# Patient Record
Sex: MALE | Race: BLACK OR AFRICAN AMERICAN | NOT HISPANIC OR LATINO | Employment: FULL TIME | URBAN - NONMETROPOLITAN AREA
[De-identification: names, ages, dates, MRNs, and addresses within clinical notes are randomized per-mention and may not be internally consistent; named-entity substitution may affect disease eponyms.]

---

## 2018-09-06 ENCOUNTER — OFFICE VISIT CONVERTED (OUTPATIENT)
Dept: FAMILY MEDICINE CLINIC | Age: 48
End: 2018-09-06
Attending: NURSE PRACTITIONER

## 2020-01-14 ENCOUNTER — HOSPITAL ENCOUNTER (OUTPATIENT)
Dept: OTHER | Facility: HOSPITAL | Age: 50
Discharge: HOME | End: 2020-01-14
Attending: NURSE PRACTITIONER

## 2020-01-14 ENCOUNTER — OFFICE VISIT CONVERTED (OUTPATIENT)
Dept: FAMILY MEDICINE CLINIC | Age: 50
End: 2020-01-14
Attending: NURSE PRACTITIONER

## 2020-01-14 LAB
ALBUMIN SERPL-MCNC: 4.1 G/DL (ref 3.5–5)
ALBUMIN/GLOB SERPL: 1.6 {RATIO} (ref 1.4–2.6)
ALP SERPL-CCNC: 81 U/L (ref 53–128)
ALT SERPL-CCNC: 20 U/L (ref 10–40)
AMYLASE SERPL-CCNC: 87 U/L (ref 30–110)
ANION GAP SERPL CALC-SCNC: 15 MMOL/L (ref 8–19)
AST SERPL-CCNC: 20 U/L (ref 15–50)
BILIRUB SERPL-MCNC: 0.2 MG/DL (ref 0.2–1.3)
BUN SERPL-MCNC: 13 MG/DL (ref 5–25)
BUN/CREAT SERPL: 10 {RATIO} (ref 6–20)
CALCIUM SERPL-MCNC: 9.1 MG/DL (ref 8.7–10.4)
CHLORIDE SERPL-SCNC: 106 MMOL/L (ref 99–111)
CONV CO2: 23 MMOL/L (ref 22–32)
CONV TOTAL PROTEIN: 6.7 G/DL (ref 6.3–8.2)
CREAT UR-MCNC: 1.28 MG/DL (ref 0.7–1.2)
ERYTHROCYTE [DISTWIDTH] IN BLOOD BY AUTOMATED COUNT: 13.9 % (ref 11.6–14.4)
GFR SERPLBLD BASED ON 1.73 SQ M-ARVRAT: >60 ML/MIN/{1.73_M2}
GLOBULIN UR ELPH-MCNC: 2.6 G/DL (ref 2–3.5)
GLUCOSE SERPL-MCNC: 104 MG/DL (ref 70–99)
HCT VFR BLD AUTO: 43 % (ref 42–52)
HGB BLD-MCNC: 13.4 G/DL (ref 14–18)
LIPASE SERPL-CCNC: 113 U/L (ref 5–51)
MCH RBC QN AUTO: 24.6 PG (ref 27–31)
MCHC RBC AUTO-ENTMCNC: 31.2 G/DL (ref 33–37)
MCV RBC AUTO: 79 FL (ref 80–96)
OSMOLALITY SERPL CALC.SUM OF ELEC: 290 MOSM/KG (ref 273–304)
PLATELET # BLD AUTO: 162 10*3/UL (ref 130–400)
PMV BLD AUTO: 11.1 FL (ref 9.4–12.4)
POTASSIUM SERPL-SCNC: 4.2 MMOL/L (ref 3.5–5.3)
RBC # BLD AUTO: 5.44 10*6/UL (ref 4.7–6.1)
SODIUM SERPL-SCNC: 140 MMOL/L (ref 135–147)
WBC # BLD AUTO: 5.76 10*3/UL (ref 4.8–10.8)

## 2020-01-20 ENCOUNTER — HOSPITAL ENCOUNTER (OUTPATIENT)
Dept: OTHER | Facility: HOSPITAL | Age: 50
Discharge: HOME | End: 2020-01-20
Attending: NURSE PRACTITIONER

## 2020-01-20 LAB
AMYLASE SERPL-CCNC: 42 U/L (ref 30–110)
CHOLEST SERPL-MCNC: 148 MG/DL (ref 107–200)
CHOLEST/HDLC SERPL: 2.9 {RATIO} (ref 3–6)
HDLC SERPL-MCNC: 51 MG/DL (ref 40–60)
LDLC SERPL CALC-MCNC: 87 MG/DL (ref 70–100)
LIPASE SERPL-CCNC: 18 U/L (ref 5–51)
TRIGL SERPL-MCNC: 48 MG/DL (ref 40–150)
VLDLC SERPL-MCNC: 10 MG/DL (ref 5–37)

## 2021-03-16 ENCOUNTER — OFFICE VISIT CONVERTED (OUTPATIENT)
Dept: FAMILY MEDICINE CLINIC | Age: 51
End: 2021-03-16
Attending: NURSE PRACTITIONER

## 2021-03-16 ENCOUNTER — HOSPITAL ENCOUNTER (OUTPATIENT)
Dept: OTHER | Facility: HOSPITAL | Age: 51
Discharge: HOME | End: 2021-03-16
Attending: NURSE PRACTITIONER

## 2021-04-14 ENCOUNTER — HOSPITAL ENCOUNTER (OUTPATIENT)
Dept: OTHER | Facility: HOSPITAL | Age: 51
Discharge: HOME | End: 2021-04-14
Attending: NURSE PRACTITIONER

## 2021-04-14 ENCOUNTER — OFFICE VISIT CONVERTED (OUTPATIENT)
Dept: FAMILY MEDICINE CLINIC | Age: 51
End: 2021-04-14
Attending: NURSE PRACTITIONER

## 2021-04-15 LAB — SARS-COV-2 RNA SPEC QL NAA+PROBE: NOT DETECTED

## 2021-04-16 LAB — BACTERIA SPEC AEROBE CULT: NORMAL

## 2021-05-18 NOTE — PROGRESS NOTES
Noel Win  1970     Office/Outpatient Visit    Visit Date: Tue, Jan 14, 2020 04:30 pm    Provider: Erum Simpson N.P. (Assistant: Mercy Snow MA)    Location: [facility]        Electronically signed by Erum Simpson N.P. on  01/15/2020 07:09:57 PM                             Subjective:        CC: Mr. Win is a 49 year old Black or  male.  He presents with epigastric pain & rumbling for the past month.          HPI:           PHQ-9 Depression Screening: Completed form scanned and in chart; Total Score 2           With regard to the epigastric pain, this is located primarily in the epigastric region.  It does not radiate.  It began 4 weeks ago.  The onset of pain occurred with no apparent trigger.  He characterizes it as aching and cramping.  He denies change in bowel habits, constipation, diarrhea, fever, nausea and vomiting.  No family members or other contacts are similarly ill.  Mr. Win denies recent travel.  Pertinent medical history includes previous h pylori.            In regard to the anxiety disorder, unspecified, stopped paxil tx due to no insurance.  now has insurance again.  wants to restart paxil.  has been taking his girlfreinds paxil recently     ROS:     CONSTITUTIONAL:  Negative for chills, fatigue, fever, and weight change.      E/N/T:  Negative for hearing problems, E/N/T pain, congestion, rhinorrhea, epistaxis, hoarseness, and dental problems.      CARDIOVASCULAR:  Negative for chest pain, palpitations, tachycardia, orthopnea, and edema.      RESPIRATORY:  Negative for cough, dyspnea, and hemoptysis.      GASTROINTESTINAL:  Positive for abdominal pain ( epigastric ).   Negative for abdominal bloating, dysphagia, constipation, diarrhea, nausea or vomiting.      MUSCULOSKELETAL:  Negative for arthralgias, back pain, and myalgias.      NEUROLOGICAL:  Negative for dizziness, headaches, paresthesias, and weakness.      PSYCHIATRIC:  Positive  for anxiety and feelings of stress.   Negative for depression, sleep disturbance or suicidal thoughts.          Past Medical History / Family History / Social History:         Last Reviewed on 1/15/2020 07:07 PM by Erum Simpson    Past Medical History:             PAST MEDICAL HISTORY         Sleep Apnea: dx'd in feb 2017; (+) sleep study; not using cpap         Surgical History:         Cholecystectomy: 2013;     left knee surgery age 18;    ORIF right 2013;         Family History:     Father: Congestive Heart Failure;  Type 2 Diabetes     paternal uncle MI age 58         Social History:         Marital Status: Single     Children: 3 children         Tobacco/Alcohol/Supplements:     Last Reviewed on 1/14/2020 04:31 PM by Mercy Snow    Tobacco: He has a past history of cigarette smoking; quit date:  jan 2016.   Current Smoker: He currently smokes some days, 1-5 cigarettes/month.          Current Problems:     Last Reviewed on 1/14/2020 04:50 PM by Erum Simpson    Anxiety disorder, unspecified    Testicular hyperfunction    Pure hyperglyceridemia    Heartburn    Encounter for screening for depression    Encounter for immunization    Vitamin D deficiency, unspecified        Immunizations:     None        Allergies:     Last Reviewed on 9/06/2018 03:46 PM by Terri Vences    No Known Allergies.        Current Medications:     Last Reviewed on 1/14/2020 04:32 PM by Mercy Snow    No Known Medications.    Paxil 40mg Tablet [Take 1 tablet(s) by mouth daily]        Objective:        Vitals:         Historical:     9/6/2018  BP:   128/68 mm Hg ( (left arm, , sitting, );) 9/6/2018  Wt:   239lbs    Current: 1/14/2020 4:34:22 PM    Ht:  6 ft, 2.75 in;  Wt: 226.2 lbs;  BMI: 28.5T: 98.2 F (oral);  BP: 133/65 mm Hg (left arm, sitting);  P: 65 bpm (left arm (BP Cuff), sitting);  sCr: 1.3 mg/dL;  GFR: 76.72        Exams:     PHYSICAL EXAM:     GENERAL:  well developed and nourished; appropriately groomed; in  no apparent distress;     RESPIRATORY: normal respiratory rate and pattern with no distress; normal breath sounds with no rales, rhonchi, wheezes or rubs;     CARDIOVASCULAR: normal rate; rhythm is regular;     GASTROINTESTINAL: nontender; normal bowel sounds; no organomegaly;     MUSCULOSKELETAL:  Normal range of motion, strength and tone;     NEUROLOGIC: mental status: alert and oriented x 3; GROSSLY INTACT     PSYCHIATRIC:  appropriate affect and demeanor; normal speech pattern; grossly normal memory;         Assessment:         Z23   Encounter for immunization       Z13.31   Encounter for screening for depression       R10.13   Epigastric pain       F41.9   Anxiety disorder, unspecified           ORDERS:         Meds Prescribed:       [New Rx] omeprazole 40 mg oral capsule,delayed release (enteric coated) [take 1 capsule (40 mg) by oral route once daily], #30 (thirty) capsules, Refills: 0 (zero)       [Refilled] Paxil 40 mg oral tablet [Take 1 tablet(s) by mouth daily], #90 (ninety) tablets, Refills: 1 (one)         Lab Orders:       [id number]  AMYS - [facility] Amylase, Serum  (Send-Out)            20667  BDCB2 - [facility] CBC w/o diff  (Send-Out)            65751  COMP - [facility] Comp. Metabolic Panel  (Send-Out)            66472  LIP - [facility] Lipase, Serum  (Send-Out)              Procedures Ordered:       39625  Immunization administration; one vaccine  (In-House)              Other Orders:       87426  Influenza virus vaccine, quadrivalent, split virus, preservative free 3 years of age & older  (In-House)              Depression screen negative  (In-House)                      Plan:         Encounter for immunization          Immunizations:       68914  Immunization administration; one vaccine  (In-House)            05981  Influenza virus vaccine, quadrivalent, split virus, preservative free 3 years of age & older  (In-House)                Dose (ml): 0.5  Site: right arm  Route: intramuscular  Administered by: Spurling, Sarah  C          : [employer]  Lot #: [id number]  Exp: 06/30/2020          NDC: [address]-50        Encounter for screening for depression    MIPS PHQ-9 Depression Screening: Completed form scanned and in chart; Total Score 2; Negative Depression Screen           Orders:         Depression screen negative  (In-House)              Epigastric pain    LABORATORY:  Labs ordered to be performed today include amylase, CBC W/O DIFF, Comprehensive metabolic panel, and Lipase.      RECOMMENDATIONS given include: discontinue use of NSAIDs and aspirin, avoid spicy foods, and to ER if worsens..            Prescriptions:       [New Rx] omeprazole 40 mg oral capsule,delayed release (enteric coated) [take 1 capsule (40 mg) by oral route once daily], #30 (thirty) capsules, Refills: 0 (zero)           Orders:       19837  AMYS - [facility] Amylase, Serum  (Send-Out)            62122  BDCB2 - [facility] CBC w/o diff  (Send-Out)            10811  COMP - [facility] Comp. Metabolic Panel  (Send-Out)            04028  LIP - [facility] Lipase, Serum  (Send-Out)              Anxiety disorder, unspecified          Prescriptions:       [Refilled] Paxil 40 mg oral tablet [Take 1 tablet(s) by mouth daily], #90 (ninety) tablets, Refills: 1 (one)             Patient Recommendations:        For  Epigastric pain:        Stop taking aspirin and anti-inflammatory medications such as ibuprofen and naproxen.     Avoid spicy foods in your diet.              Charge Capture:         Primary Diagnosis:     Z23  Encounter for immunization           Orders:      19223  Office/outpatient visit; established patient, level 4  (In-House)            03780  Immunization administration; one vaccine  (In-House)            24344  Influenza virus vaccine, quadrivalent, split virus, preservative free 3 years of age & older  (In-House)              Z13.31  Encounter for screening for depression           Orders:        Depression screen negative  (In-House)              R10.13   Epigastric pain     F41.9  Anxiety disorder, unspecified

## 2021-05-18 NOTE — PROGRESS NOTES
Noel Win  1970     Office/Outpatient Visit    Visit Date: Tue, Mar 16, 2021 01:48 pm    Provider: Erum Simpson N.P. (Assistant: Mariella Belcher MA)    Location: [facility]        Electronically signed by Erum Simpson N.P. on  03/16/2021 08:45:51 PM                             Subjective:        CC: Mr. Win is a 50 year old Black or  male.  right knee pain, went to ER last monday         HPI:           hx right meniscus repoar per dr arellano 3 yrs ago.  pain never completely resolved and has increased the last couple of weeks.  no known injury.  knee is less stiff in am.  sometimes feels as if knee might give out under his weight and knee pops.  went to [facility] ER last week.  xray done .  no abnormality reported to him.  rec'd steroid shot in buttocks and pain shot in arm.  not very helpful.            Additionally, he presents with history of anxiety disorder, unspecified.  does better with use of paxil and he has run out or followed up.  denies side effects.  requests refills.            tx one year ago.  never had follow up test.  still with some GERD symptoms.      ROS:     CONSTITUTIONAL:  Negative for chills, fatigue, fever, and weight change.      CARDIOVASCULAR:  Negative for chest pain, palpitations, tachycardia, orthopnea, and edema.      RESPIRATORY:  Negative for cough, dyspnea, and hemoptysis.      GASTROINTESTINAL:  Positive for acid reflux symptoms and heartburn.   Negative for constipation, diarrhea, nausea or vomiting.      MUSCULOSKELETAL:  Positive for arthralgias (right knee).      PSYCHIATRIC:  Positive for anxiety.   Negative for sleep disturbance.          Past Medical History / Family History / Social History:         Last Reviewed on 3/16/2021 02:03 PM by Erum Simpson    Past Medical History:             PAST MEDICAL HISTORY         Sleep Apnea: dx'd in feb 2017; (+) sleep study; not using cpap         Surgical History:          Cholecystectomy: 2013;     right menisucus repair 2018;     left knee surgery age 18;    ORIF right 2013;         Family History:     Father: Congestive Heart Failure;  Type 2 Diabetes     paternal uncle MI age 58         Social History:         Marital Status: Single     Children: 3 children         Tobacco/Alcohol/Supplements:     Last Reviewed on 3/16/2021 01:52 PM by Mariella Belcher    Tobacco: Current Smoker: He currently smokes every day, 1/2 pack per day.          Current Problems:     Last Reviewed on 3/16/2021 08:42 PM by Erum Simpson    Anxiety disorder, unspecified    Pure hyperglyceridemia    Vitamin D deficiency, unspecified    Epigastric pain    Helicobacter pylori [H. pylori] as the cause of diseases classified elsewhere    Pain in right knee        Immunizations:     influenza, injectable, quadrivalent, preservative free (FLUZONE QUAD 5660-9586) 1/14/2020        Allergies:     Last Reviewed on 3/16/2021 01:52 PM by Mariella Belcher    No Known Allergies.        Current Medications:     Last Reviewed on 3/16/2021 01:52 PM by Mariella Belcher    No Known Medications.    Paxil 40 mg oral tablet [Take 1 tablet(s) by mouth daily]    omeprazole 40 mg oral capsule,delayed release (enteric coated) [take 1 capsule (40 mg) by oral route once daily]        Objective:        Vitals:         Historical:     1/14/2020  BP:   133/65 mm Hg ( (left arm, , sitting, );) 1/14/2020  Wt:   226.2lbs    Current: 3/16/2021 1:53:41 PM    Ht:  6 ft, 2.75 in;  Wt: 240.4 lbs;  BMI: 30.2T: 96.3 F (temporal);  BP: 139/76 mm Hg (left arm, sitting);  P: 79 bpm (left arm (BP Cuff), sitting);  sCr: 1.28 mg/dL;  GFR: 79.10        Exams:     PHYSICAL EXAM:     GENERAL: no apparent distress;     RESPIRATORY: normal respiratory rate and pattern with no distress; normal breath sounds with no rales, rhonchi, wheezes or rubs;     CARDIOVASCULAR: normal rate; rhythm is regular;  no edema;     MUSCULOSKELETAL: gait: affected by a right leg limp;   normal range of motion of all major muscle groups; pain with range of motion in: right knee flexion and extension;     NEUROLOGIC: mental status: alert and oriented x 3; GROSSLY INTACT     PSYCHIATRIC:  appropriate affect and demeanor; normal speech pattern; grossly normal memory;         Assessment:         M25.561   Pain in right knee       F41.9   Anxiety disorder, unspecified       B96.81   Helicobacter pylori [H. pylori] as the cause of diseases classified elsewhere           ORDERS:         Meds Prescribed:       [New Rx] meloxicam 15 mg oral tablet [take 1 tablet (15 mg) by oral route once daily do not take with ibuprofen or aleve-  take with food], #30 (thirty) tablets, Refills: 0 (zero)       [Refilled] Paxil 40 mg oral tablet [Take 1 tablet(s) by mouth daily], #90 (ninety) tablets, Refills: 1 (one)         Lab Orders:       39298  HPUBT - [facility] H.pylori Breath test  (Send-Out)                      Plan:         Pain in right kneework note march 18 and march 19        RECOMMENDATIONS given include: rest, ice , compression, elevation.  obtain [facility] ER records fro review. and consider MRI right knee or referral to ortho.  MIPS Vaccines Flu and Pneumonia updated in Shot record           Prescriptions:       [New Rx] meloxicam 15 mg oral tablet [take 1 tablet (15 mg) by oral route once daily do not take with ibuprofen or aleve-  take with food], #30 (thirty) tablets, Refills: 0 (zero)         Anxiety disorder, unspecified          Prescriptions:       [Refilled] Paxil 40 mg oral tablet [Take 1 tablet(s) by mouth daily], #90 (ninety) tablets, Refills: 1 (one)         Helicobacter pylori [H. pylori] as the cause of diseases classified elsewhere    LABORATORY:  Labs ordered to be performed today include H.pylori urea breath test ([facility]).            Orders:       00392  HPUBT - [facility] H.pylori Breath test  (Send-Out)                  Charge Capture:         Primary Diagnosis:     M25.561  Pain in right knee            Orders:      17478  Office/outpatient visit; established patient, level 3  (In-House)              F41.9  Anxiety disorder, unspecified     B96.81  Helicobacter pylori [H. pylori] as the cause of diseases classified elsewhere

## 2021-05-18 NOTE — PROGRESS NOTES
Noel Win 1970     Office/Outpatient Visit    Visit Date: Wed, Apr 14, 2021 4:04 pm    Provider: Brandy Goncalves N.P. (Assistant: Mariella Belcher MA )    Location: [facility]        Electronically signed by Brandy Goncalves N.P. on  04/16/2021 10:56:35 PM                             Subjective:        CC: Mr. Win is a 51 year old Black or  male.  cough, chills/sweats, congestion, weak, sore throat         HPI:           Patient to be evaluated for cough.  It has been present for the past 2 weeks.  Respiratory symptoms include cough and sinus pressure.  Other symptoms include allergy symptoms, body aches, chest congestion, nasal congestion, sinus pain/pressure, chills, sweats and fatigue.  He denies fever.  He reports recent exposure to illness from family members.  He has already tried to relieve the symptoms with acetaminophen, antihistamines, and cough medication.      ROS:     CONSTITUTIONAL:  Positive for chills and fatigue.   Negative for fever.      E/N/T:  Positive for nasal congestion, sinus pressure and sore throat.      CARDIOVASCULAR:  Negative for chest pain and pedal edema.      RESPIRATORY:  Positive for recent cough and pleuritic chest pain.   Negative for dyspnea.      GASTROINTESTINAL:  Negative for diarrhea, nausea and vomiting.      MUSCULOSKELETAL:  Positive for arthralgias and myalgias.      NEUROLOGICAL:  Positive for weakness.      ENDOCRINE:  Negative for hair loss, polydipsia and polyphagia.      ALLERGIC/IMMUNOLOGIC:  Negative for seasonal allergies.      PSYCHIATRIC:  Negative for anxiety, depression and suicidal thoughts.          Past Medical History / Family History / Social History:         Last Reviewed on 3/16/2021 02:03 PM by Erum Simpson    Past Medical History:             PAST MEDICAL HISTORY         Sleep Apnea: dx'd in feb 2017; (+) sleep study; not using cpap         Surgical History:         Cholecystectomy: 2013;     right  menisucus repair 2018;     left knee surgery age 18;    ORIF right 2013;         Family History:     Father: Congestive Heart Failure;  Type 2 Diabetes     paternal uncle MI age 58         Social History:         Marital Status: Single     Children: 3 children         Tobacco/Alcohol/Supplements:     Last Reviewed on 4/14/2021 04:13 PM by Mariella Belcher    Tobacco: Current Smoker: He currently smokes every day, 1/2 pack per day.          Current Problems:     Last Reviewed on 4/16/2021 10:50 PM by Brandy Goncalves    Anxiety disorder, unspecified    Pure hyperglyceridemia    Vitamin D deficiency, unspecified    Epigastric pain    Helicobacter pylori [H. pylori] as the cause of diseases classified elsewhere    Pain in right knee    Acute bronchitis, unspecified    Cough    Acute pharyngitis, unspecified        Immunizations:     influenza, injectable, quadrivalent, preservative free (FLUZONE QUAD 3091-2018) 1/14/2020        Allergies:     Last Reviewed on 4/14/2021 04:13 PM by Mariella Belcher    No Known Allergies.        Current Medications:     Last Reviewed on 4/14/2021 04:13 PM by Mariella Belcher    No Known Medications.    Paxil 40 mg oral tablet [Take 1 tablet(s) by mouth daily]    omeprazole 40 mg oral capsule,delayed release (enteric coated) [take 1 capsule (40 mg) by oral route once daily]    meloxicam 15 mg oral tablet [take 1 tablet (15 mg) by oral route once daily do not take with ibuprofen or aleve-  take with food]        Objective:        Vitals:         Current: 4/14/2021 4:15:10 PM    Ht:  6 ft, 2.75 in;  Wt: 242 lbs;  BMI: 30.5T: 98.2 F (temporal);  BP: 127/76 mm Hg (left arm, sitting);  P: 88 bpm (left arm (BP Cuff), sitting);  sCr: 1.28 mg/dL;  GFR: 78.47O2 Sat: 98 % (room air)        Exams:     PHYSICAL EXAM:     GENERAL: Vitals recorded well developed, well nourished;  no apparent distress, appears moderately ill;     E/N/T: EARS: external auditory canal normal bilaterally and erythematous  bilaterally;  bilateral TMs are normal;  OROPHARYNX:  normal mucosa, dentition, gingiva, and posterior pharynx;     RESPIRATORY: normal appearance and symmetric expansion of chest wall; normal respiratory rate and pattern with no distress; normal breath sounds with no rales, rhonchi, wheezes or rubs;     CARDIOVASCULAR: normal rate; rhythm is regular;     LYMPHATIC: no enlargement of cervical or facial nodes;     MUSCULOSKELETAL: normal gait; normal range of motion of all major muscle groups; no limb or joint pain with range of motion;     NEUROLOGIC: mental status: alert and oriented x 3;     PSYCHIATRIC: appropriate affect and demeanor; normal speech pattern; normal thought and perception;         Lab/Test Results:         Rapid Strep Screen: Negative (04/14/2021),     Performed by:: tls (04/14/2021),     Influenza A and B: Negative (04/14/2021),     Rapid SARS: Negative (04/14/2021),     Performed by: tls (04/14/2021),             Assessment:         J20.9   Acute bronchitis, unspecified       R05   Cough       J02.9   Acute pharyngitis, unspecified           ORDERS:         Meds Prescribed:       [New Rx] azithromycin 250 mg oral tablet [take 2 tablets today and 1 tablet (250 mg) by oral route once daily x the next 4 days], #6 (six) tablets, Refills: 0 (zero)       [New Rx] Bromfed DM 2-30-10 mg/5 mL oral Syrup [take 10 milliliters by oral route every 4 hours PRN], #240 (two hundred and forty) milliliters, Refills: 1 (one)         Lab Orders:       [id number]  Coronavirus antigen detection by immunoassay technique, COVID-19  (In-House)            72403-07  Infectious agent antigen detection by immunoassay; Influenza  (In-House)            07201  Group A Streptococcus detection by immunoassay with direct optical observation  (In-House)            56949  Infectious agent antigen detection by immunoassay; Influenza  (In-House)            62822  [facility] Throat culture, strep  (Send-Out)            07438  COVID 19  Testing  (Send-Out)                      Plan:         Acute bronchitis, unspecifiedIf no improvement or worsening, ER precautions given or RTO for further assessment.  Will get CXR if no improvement     LABORATORY:  Labs ordered to be performed today include COVID 19 Testing.            Prescriptions:       [New Rx] azithromycin 250 mg oral tablet [take 2 tablets today and 1 tablet (250 mg) by oral route once daily x the next 4 days], #6 (six) tablets, Refills: 0 (zero)           Orders:       86574  Coronavirus antigen detection by immunoassay technique, COVID-19  (In-House)            92988-45  Infectious agent antigen detection by immunoassay; Influenza  (In-House)            03062  Group A Streptococcus detection by immunoassay with direct optical observation  (In-House)            52517  Infectious agent antigen detection by immunoassay; Influenza  (In-House)            73050  [facility] Throat culture, strep  (Send-Out)            56666  COVID 19 Testing  (Send-Out)              Cough          Prescriptions:       [New Rx] Bromfed DM 2-30-10 mg/5 mL oral Syrup [take 10 milliliters by oral route every 4 hours PRN], #240 (two hundred and forty) milliliters, Refills: 1 (one)         Acute pharyngitis, unspecifiedwarm salt gargles,  Take OTC antihistamines daily, fluticasone OTC and RTO if no improvement            Charge Capture:         Primary Diagnosis:     J20.9  Acute bronchitis, unspecified           Orders:      54531  Office/outpatient visit; established patient, level 3  (In-House)            28012  Coronavirus antigen detection by immunoassay technique, COVID-19  (In-House)            69727-98  Infectious agent antigen detection by immunoassay; Influenza  (In-House)            63681  Group A Streptococcus detection by immunoassay with direct optical observation  (In-House)            51069  Infectious agent antigen detection by immunoassay; Influenza  (In-House)              R05  Cough     J02.9   Acute pharyngitis, unspecified

## 2021-05-18 NOTE — PROGRESS NOTES
Noel Win 1970     Office/Outpatient Visit    Visit Date: Thu, Sep 6, 2018 03:44 pm    Provider: Erum Simpson N.P. (Assistant: Terri Vences MA)    Location: [facility]        Electronically signed by Erum Simpson N.P. on  09/06/2018 08:53:09 PM                             SUBJECTIVE:        CC:     Mr. Win is a 48 year old Black or  male.  patient presents today with complaints of worsening anxiety and some panic attacks.          HPI:         PHQ-9 Depression Screening: Completed form scanned and in chart; Total Score 9 Alcohol Consumption Screening: Completed form scanned and in chart; Total Score 6         In regard to the anxiety, generalized, his anxiety disorder was originally diagnosed more than 5 years ago.  His symptom complex includes apprehension, feeling of impending doom, and palpitations.  True panic attacks apparently do not occur.  The frequency symptoms is several times per week.  Apparent triggers include occupational stressors and will be laid off from work.  He is not currently being treated for anxiety.  Previous attempts at treatment have included Paxil.  He denies pertinent past medical history.          noted per oct 2017 labs.          not currently on supplement.          In regard to the gERD, the location of the discomfort is primarily epigastric.  He describes the pain as burning.  It is mild in intensity.  this has been a chronic, recurrent problem for the past 2 months.  Past medical history is pertinent for documented H. pylori.      ROS:     CONSTITUTIONAL:  Negative for chills, fatigue, fever, and weight change.      CARDIOVASCULAR:  Negative for chest pain, palpitations, tachycardia, orthopnea, and edema.      RESPIRATORY:  Negative for cough, dyspnea, and hemoptysis.      GASTROINTESTINAL:  Positive for abdominal pain ( epigastric ) and acid reflux symptoms.   Negative for constipation, diarrhea, heartburn, nausea or vomiting.       MUSCULOSKELETAL:  Negative for arthralgias, back pain, and myalgias.      NEUROLOGICAL:  Negative for dizziness, headaches, paresthesias, and weakness.      PSYCHIATRIC:  Positive for anxiety, feelings of stress and insomnia.   Negative for depression, difficulty concentrating, recreational drug use or suicidal thoughts.          PMH/FMH/SH:     Last Reviewed on 9/06/2018 04:04 PM by Erum Simpson    Past Medical History:             PAST MEDICAL HISTORY         Sleep Apnea: dx'd in feb 2017; (+) sleep study; not using cpap         Surgical History:         Cholecystectomy: 2013;      left knee surgery age 18;    ORIF right 2013;         Family History:     Father: Congestive Heart Failure;  Type 2 Diabetes     paternal uncle MI age 58         Social History:         Marital Status: Single     Children: 3 children         Tobacco/Alcohol/Supplements:     Last Reviewed on 9/06/2018 03:46 PM by Terri Vences    Tobacco: He has a past history of cigarette smoking; quit date:  jan 2016.   Current Smoker: He currently smokes some days, 1-5 cigarettes/month.              Current Problems:     Last Reviewed on 1/19/2017 11:33 AM by Demarcus Layton    Elevated cholesterol     Testosterone deficiency     Anxiety, generalized         Immunizations:     None        Allergies:     Last Reviewed on 9/26/2017 02:49 PM by Salome Jacobsen      No Known Drug Allergies.         Current Medications:     Last Reviewed on 9/06/2018 03:46 PM by Terri Vences      No Known Medications.         OBJECTIVE:        Vitals:         Historical:     09/26/2017  BP:   127/82 mm Hg ( (left arm, , sitting, );)     09/26/2017  Wt:   252lbs        Current: 9/6/2018 3:49:35 PM    Ht:  6 ft, 2.75 in;  Wt: 239 lbs;  BMI: 30.1    T: 98.7 F (oral);  BP: 128/68 mm Hg (left arm, sitting);  P: 73 bpm (left arm (BP Cuff), sitting);  sCr: 1.3 mg/dL;  GFR: 79.37        Exams:     PHYSICAL EXAM:     GENERAL:  well developed and nourished;  appropriately groomed; in no apparent distress;     NECK: thyroid is non-palpable;     RESPIRATORY: normal respiratory rate and pattern with no distress; normal breath sounds with no rales, rhonchi, wheezes or rubs;     CARDIOVASCULAR: normal rate; rhythm is regular;     MUSCULOSKELETAL:  Normal range of motion, strength and tone;     NEUROLOGIC: mental status: alert and oriented x 3; GROSSLY INTACT     PSYCHIATRIC:  appropriate affect and demeanor; normal speech pattern; grossly normal memory;         ASSESSMENT           V79.0   Z13.89  Screening for depression              DDx:     300.02   F41.9  Anxiety, generalized              DDx:     790.6   R73.01  Hyperglycemia              DDx:     268.9   E55.9  Vitamin D deficiency, unspecified              DDx:     530.81   R12  GERD              DDx:     272.4   E78.2  Hyperlipidemia              DDx:         ORDERS:         Meds Prescribed:       Paxil (Paroxetine HCl) 40mg Tablet Take 1 tablet(s) by mouth daily  #90 (Ninety) tablet(s) Refills: 1       Omeprazole 40mg Capsules, Extended Release 1 capsule daily  #30 (Thirty) capsule(s) Refills: 0         Lab Orders:       61754  COMP - [facility] Comp. Metabolic Panel  (Send-Out)         43855  A1CEG - H Hemoglobin A1C  (Send-Out)         68546  VITD - [facility] Vitamin D, 25 Hydroxy  (Send-Out)         26410  HPUBT - [facility] H.pylori Breath test  (Send-Out)         97241  LPDP - [facility] Lipid Panel  (Send-Out)           Other Orders:         Depression screen positive and follow up plan documented  (In-House)                   PLAN:          Screening for depression     MIPS PHQ-9 Depression Screening: Completed form scanned and in chart; Total Score 9           Orders:         Depression screen positive and follow up plan documented  (In-House)            Anxiety, generalized         RECOMMENDATIONS given include: avoidance of caffeine, stress reduction, and paxil has always worked well prreviously.  restart paxil and take  consistently..            Prescriptions:       Paxil (Paroxetine HCl) 40mg Tablet Take 1 tablet(s) by mouth daily  #90 (Ninety) tablet(s) Refills: 1          Hyperglycemia     LABORATORY:  Labs ordered to be performed today include Comprehensive metabolic panel and HgbA1C.      RECOMMENDATIONS given include: decrease intake of sugar especially sugar sweetened drinks..            Orders:       27844  COMP - [facility] Comp. Metabolic Panel  (Send-Out)         18313  A1CEG - H Hemoglobin A1C  (Send-Out)            Vitamin D deficiency, unspecified     LABORATORY:  Labs ordered to be performed today include Vitamin D.            Orders:       62453  VITD - [facility] Vitamin D, 25 Hydroxy  (Send-Out)            GERD     LABORATORY:  Labs ordered to be performed today include H.pylori urea breath test ([facility]).            Prescriptions:       Omeprazole 40mg Capsules, Extended Release 1 capsule daily  #30 (Thirty) capsule(s) Refills: 0           Orders:       07520  HPUBT - [facility] H.pylori Breath test  (Send-Out)            Hyperlipidemia     LABORATORY:  Labs ordered to be performed today include lipid panel.            Orders:       90007  LPDP - [facility] Lipid Panel  (Send-Out)               Patient Recommendations:        For  Anxiety, generalized:     Try to avoid or reduce the amount of caffeine intake. Try stress reduction methods to reduce the frequency or lessen the severity of anxiety episodes.              CHARGE CAPTURE           **Please note: ICD descriptions below are intended for billing purposes only and may not represent clinical diagnoses**        Primary Diagnosis:         V79.0 Screening for depression            Z13.89    Encounter for screening for other disorder              Orders:          55293   Office/outpatient visit; established patient, level 4  (In-House)                Depression screen positive and follow up plan documented  (In-House)           300.02 Anxiety, generalized            F41.9    Anxiety  disorder, unspecified    790.6 Hyperglycemia            R73.01    Impaired fasting glucose    268.9 Vitamin D deficiency, unspecified            E55.9    Vitamin D deficiency, unspecified    530.81 GERD            R12    Heartburn    272.4 Hyperlipidemia            E78.2    Mixed hyperlipidemia

## 2021-07-01 VITALS
BODY MASS INDEX: 29.72 KG/M2 | WEIGHT: 239 LBS | SYSTOLIC BLOOD PRESSURE: 128 MMHG | HEIGHT: 75 IN | DIASTOLIC BLOOD PRESSURE: 68 MMHG | HEART RATE: 73 BPM | TEMPERATURE: 98.7 F

## 2021-07-02 VITALS
SYSTOLIC BLOOD PRESSURE: 139 MMHG | DIASTOLIC BLOOD PRESSURE: 76 MMHG | BODY MASS INDEX: 29.89 KG/M2 | WEIGHT: 240.4 LBS | HEIGHT: 75 IN | TEMPERATURE: 96.3 F | HEART RATE: 79 BPM

## 2021-07-02 VITALS
TEMPERATURE: 98.2 F | HEART RATE: 65 BPM | DIASTOLIC BLOOD PRESSURE: 65 MMHG | BODY MASS INDEX: 28.12 KG/M2 | WEIGHT: 226.2 LBS | SYSTOLIC BLOOD PRESSURE: 133 MMHG | HEIGHT: 75 IN

## 2021-07-02 VITALS
BODY MASS INDEX: 30.09 KG/M2 | HEIGHT: 75 IN | WEIGHT: 242 LBS | OXYGEN SATURATION: 98 % | HEART RATE: 88 BPM | SYSTOLIC BLOOD PRESSURE: 127 MMHG | TEMPERATURE: 98.2 F | DIASTOLIC BLOOD PRESSURE: 76 MMHG

## 2021-08-24 ENCOUNTER — TRANSCRIBE ORDERS (OUTPATIENT)
Dept: FAMILY MEDICINE CLINIC | Age: 51
End: 2021-08-24

## 2021-08-24 ENCOUNTER — CLINICAL SUPPORT (OUTPATIENT)
Dept: FAMILY MEDICINE CLINIC | Age: 51
End: 2021-08-24

## 2021-08-24 DIAGNOSIS — R19.7 DIARRHEA, UNSPECIFIED TYPE: Primary | ICD-10-CM

## 2021-08-24 PROCEDURE — U0003 INFECTIOUS AGENT DETECTION BY NUCLEIC ACID (DNA OR RNA); SEVERE ACUTE RESPIRATORY SYNDROME CORONAVIRUS 2 (SARS-COV-2) (CORONAVIRUS DISEASE [COVID-19]), AMPLIFIED PROBE TECHNIQUE, MAKING USE OF HIGH THROUGHPUT TECHNOLOGIES AS DESCRIBED BY CMS-2020-01-R: HCPCS | Performed by: FAMILY MEDICINE

## 2021-08-26 ENCOUNTER — TELEPHONE (OUTPATIENT)
Dept: FAMILY MEDICINE CLINIC | Age: 51
End: 2021-08-26

## 2021-08-26 LAB — SARS-COV-2 RNA RESP QL NAA+PROBE: NOT DETECTED

## 2021-08-26 NOTE — TELEPHONE ENCOUNTER
Caller: Noel Win Jr.    Relationship: Self    Best call back number: [phone]    Caller requesting test results: YES    What test was performed: COVID    When was the test performed: TUESDAY    Where was the test performed: OFFICE     Additional notes:

## 2021-08-27 ENCOUNTER — TELEPHONE (OUTPATIENT)
Dept: FAMILY MEDICINE CLINIC | Age: 51
End: 2021-08-27

## 2021-11-23 ENCOUNTER — OFFICE VISIT (OUTPATIENT)
Dept: FAMILY MEDICINE CLINIC | Age: 51
End: 2021-11-23

## 2021-11-23 VITALS
HEIGHT: 75 IN | WEIGHT: 252.4 LBS | HEART RATE: 73 BPM | TEMPERATURE: 98.3 F | DIASTOLIC BLOOD PRESSURE: 67 MMHG | BODY MASS INDEX: 31.38 KG/M2 | OXYGEN SATURATION: 99 % | SYSTOLIC BLOOD PRESSURE: 143 MMHG

## 2021-11-23 DIAGNOSIS — G47.63 BRUXISM, SLEEP-RELATED: ICD-10-CM

## 2021-11-23 DIAGNOSIS — K08.9 POOR DENTITION: Primary | ICD-10-CM

## 2021-11-23 PROCEDURE — 99213 OFFICE O/P EST LOW 20 MIN: CPT | Performed by: FAMILY MEDICINE

## 2021-11-23 RX ORDER — PAROXETINE HYDROCHLORIDE 40 MG/1
40 TABLET, FILM COATED ORAL EVERY MORNING
COMMUNITY

## 2021-11-23 RX ORDER — AMOXICILLIN AND CLAVULANATE POTASSIUM 875; 125 MG/1; MG/1
1 TABLET, FILM COATED ORAL 2 TIMES DAILY
Qty: 28 TABLET | Refills: 0 | Status: SHIPPED | OUTPATIENT
Start: 2021-11-23 | End: 2021-12-07

## 2021-11-23 NOTE — PROGRESS NOTES
"Chief Complaint  Edema (Pt states that about a week ago, he felt some knots in his mouth on both sides, then pain started in his head as well, now pt states that his face is slightly swollen.)    Subjective          Noel Win JrYasemin presents to [facility]  History of Present Illness 51-year-old male, presented as a walk-in today to the office for an acute visit.  He has had some left-sided facial swelling has what he reported to us also feels that he has some \"knots\" in his mouth on both sides.  Then he started having pain he says in his head along with the swelling in his face.    He has some slight chronic sinus congestion.  Mostly his pain is in the front of his mouth and his teeth which worse when he drinks of the cold and has been bothering him ever since.  He admits that he has not seen his dentist in a very long time.  He also believes that he does not fact grind his teeth at night.    Review of Systems   Constitutional: Negative.    HENT: Positive for congestion, dental problem, facial swelling, postnasal drip and sinus pressure. Negative for drooling, ear discharge, ear pain, hearing loss, mouth sores, nosebleeds, rhinorrhea, sinus pain, sneezing, sore throat, tinnitus, trouble swallowing and voice change.         Facial swelling I do not really notice today but the patient points to her around his sinus area both frontal and maxillary.   Eyes: Negative.    Respiratory: Negative.    Cardiovascular: Negative.         No Known Allergies    Current Outpatient Medications:   •  PARoxetine (Paxil) 40 MG tablet, Take 40 mg by mouth Every Morning., Disp: , Rfl:     Objective   Vital Signs:   /67 (BP Location: Right arm, Patient Position: Sitting)   Pulse 73   Temp 98.3 °F (36.8 °C) (Oral)   Ht 189.9 cm (74.75\")   Wt 114 kg (252 lb 6.4 oz)   SpO2 99% Comment: room air  BMI 31.76 kg/m²     Physical Exam  Vitals reviewed.   Constitutional:       General: He is not in " acute distress.     Appearance: Normal appearance. He is normal weight. He is not ill-appearing, toxic-appearing or diaphoretic.   HENT:      Head: Normocephalic and atraumatic.      Right Ear: Tympanic membrane and ear canal normal.      Left Ear: Tympanic membrane and ear canal normal.      Nose:      Comments: Minimal turbinate edema, minimal postnasal drip.  Some tenderness both on frontal and maxillary sinuses bilaterally.     Mouth/Throat:      Mouth: Mucous membranes are dry.      Pharynx: Oropharynx is clear.      Comments: Poor dentition overall with severely worn teeth especially in the upper and lower incisor area.  Staining of teeth, few dental caries as well.  Eyes:      Extraocular Movements: Extraocular movements intact.      Conjunctiva/sclera: Conjunctivae normal.      Pupils: Pupils are equal, round, and reactive to light.   Cardiovascular:      Rate and Rhythm: Normal rate and regular rhythm.      Pulses: Normal pulses.      Heart sounds: Normal heart sounds.   Musculoskeletal:      Cervical back: Normal range of motion and neck supple. No rigidity or tenderness.   Lymphadenopathy:      Cervical: No cervical adenopathy.   Neurological:      Mental Status: He is alert.        Result Review :              Assessment and Plan    Diagnoses and all orders for this visit:    1. Poor dentition (Primary)    2. Bruxism, sleep-related    Patient was advised to seek dental care as soon as possible.  I did advise him that we will start him on antibiotic for empiric therapy as he may also have some sinus involvement from his poor dentition and caries.  I also advised him that if he has sleep related bruxism, this is not a normal thing and he is going to need to be fitted for an appliance to wear at night to keep him from grinding down his teeth.    Patient should schedule a regular visit routine adult well visit with his usual primary care physician in the near future as well.  Patient was agreeable to this  plan, antibiotic sent to his pharmacy.    Follow Up   No follow-ups on file.  Patient was given instructions and counseling regarding his condition or for health maintenance advice. Please see specific information pulled into the AVS if appropriate.